# Patient Record
Sex: MALE | Race: WHITE | NOT HISPANIC OR LATINO | Employment: FULL TIME | URBAN - NONMETROPOLITAN AREA
[De-identification: names, ages, dates, MRNs, and addresses within clinical notes are randomized per-mention and may not be internally consistent; named-entity substitution may affect disease eponyms.]

---

## 2024-05-24 ENCOUNTER — HOSPITAL ENCOUNTER (EMERGENCY)
Facility: HOSPITAL | Age: 54
Discharge: HOME | End: 2024-05-24
Attending: STUDENT IN AN ORGANIZED HEALTH CARE EDUCATION/TRAINING PROGRAM
Payer: COMMERCIAL

## 2024-05-24 VITALS
SYSTOLIC BLOOD PRESSURE: 128 MMHG | RESPIRATION RATE: 15 BRPM | TEMPERATURE: 97.1 F | BODY MASS INDEX: 28.14 KG/M2 | WEIGHT: 190 LBS | OXYGEN SATURATION: 98 % | HEART RATE: 80 BPM | DIASTOLIC BLOOD PRESSURE: 70 MMHG | HEIGHT: 69 IN

## 2024-05-24 DIAGNOSIS — T18.9XXA SWALLOWED FOREIGN BODY, INITIAL ENCOUNTER: Primary | ICD-10-CM

## 2024-05-24 PROCEDURE — 99284 EMERGENCY DEPT VISIT MOD MDM: CPT | Mod: 25

## 2024-05-24 PROCEDURE — 2500000004 HC RX 250 GENERAL PHARMACY W/ HCPCS (ALT 636 FOR OP/ED): Mod: JZ

## 2024-05-24 PROCEDURE — 96374 THER/PROPH/DIAG INJ IV PUSH: CPT

## 2024-05-24 RX ADMIN — GLUCAGON 1 MG: KIT at 19:05

## 2024-05-24 ASSESSMENT — PAIN - FUNCTIONAL ASSESSMENT: PAIN_FUNCTIONAL_ASSESSMENT: 0-10

## 2024-05-24 ASSESSMENT — PAIN SCALES - GENERAL: PAINLEVEL_OUTOF10: 0 - NO PAIN

## 2024-05-28 NOTE — ED PROVIDER NOTES
Chief Complaint: Food stuck in throat  HPI: This is a 53-year-old male, presenting to the emergency department for evaluation of feeling like there is food stuck in his esophagus.  Patient states that he has had trouble with this over the last several years, however has never been formally evaluated for esophageal stricture.  He states that he took a big bite of steak, and feels that it is stuck at the bottom of his esophagus.  He denies any difficulty breathing.  He denies any abdominal pain.  He states that he is unable to tolerate p.o. as it comes right back out.    No past medical history on file.   No past surgical history on file.    Physical Exam  Constitutional:       Appearance: Normal appearance.   HENT:      Head: Normocephalic and atraumatic.      Mouth/Throat:      Mouth: Mucous membranes are moist.   Eyes:      Extraocular Movements: Extraocular movements intact.   Cardiovascular:      Rate and Rhythm: Normal rate.   Pulmonary:      Effort: Pulmonary effort is normal.   Abdominal:      General: Abdomen is flat.      Palpations: Abdomen is soft.   Skin:     General: Skin is warm.   Neurological:      General: No focal deficit present.      Mental Status: He is alert.   Psychiatric:         Mood and Affect: Mood normal.            ED Course/MDM  Diagnoses as of 05/28/24 0919   Swallowed foreign body, initial encounter     This is a 53 y.o. male presenting to the ED for evaluation of feeling like there is food stuck in his throat.  Patient states that this has happened in the past however he has never been evaluated for it.  He states that he ate steak just prior to arrival and feels that it is stuck in the bottom of his esophagus.  On physical exam, the patient is resting comfortably in the bed, no acute distress.  Abdomen is soft and nontender.  Patient is handling his secretions without difficulty.  He was given a shot of glucagon, and on reevaluation, feels that the food bolus is passed.  He was able  to tolerate p.o. intake without difficulty, and was ultimately discharged home in stable condition with follow-up with surgery for EGD.  He was advised to return to the emergency department for any worsening symptoms and was discharged home in stable condition.    Final Impression  1.  Food bolus  Disposition/Plan: Discharge home  Condition at disposition: Stable.     Ester Harman DO  Emergency Medicine Physician     Ester Harman DO  05/28/24 0921